# Patient Record
Sex: MALE | Race: WHITE | NOT HISPANIC OR LATINO | URBAN - METROPOLITAN AREA
[De-identification: names, ages, dates, MRNs, and addresses within clinical notes are randomized per-mention and may not be internally consistent; named-entity substitution may affect disease eponyms.]

---

## 2020-03-04 ENCOUNTER — OFFICE (OUTPATIENT)
Dept: URBAN - METROPOLITAN AREA CLINIC 79 | Facility: CLINIC | Age: 55
End: 2020-03-04

## 2020-03-04 VITALS
TEMPERATURE: 97.9 F | SYSTOLIC BLOOD PRESSURE: 120 MMHG | HEIGHT: 66 IN | HEART RATE: 72 BPM | WEIGHT: 177 LBS | DIASTOLIC BLOOD PRESSURE: 84 MMHG

## 2020-03-04 DIAGNOSIS — K76.0 FATTY (CHANGE OF) LIVER, NOT ELSEWHERE CLASSIFIED: ICD-10-CM

## 2020-03-04 DIAGNOSIS — Z12.11 ENCOUNTER FOR SCREENING FOR MALIGNANT NEOPLASM OF COLON: ICD-10-CM

## 2020-03-04 DIAGNOSIS — R12 HEARTBURN: ICD-10-CM

## 2020-03-04 DIAGNOSIS — R10.11 RIGHT UPPER QUADRANT PAIN: ICD-10-CM

## 2020-03-04 PROCEDURE — 00031: CPT | Performed by: INTERNAL MEDICINE

## 2020-03-04 PROCEDURE — 99204 OFFICE O/P NEW MOD 45 MIN: CPT | Performed by: PHYSICIAN ASSISTANT

## 2020-03-04 NOTE — SERVICEHPINOTES
GER SMITH   is a   55   year old male who is being seen in consultation at the request of   GLORIA SAINI   for concerns about his gallbladder and also to schedule a screening colonoscopy as he has never had one. He notes that he has been having pain in gallbladder area and reports a negative U/S and then a f/u HIDA scan showing EF of 18% (we do not have these records).Patient has h/o RUQ pain for the past 3 years. Can happen with exercise, stress, lack of food intake, and also with fatty foods. Worst symptoms are post-prandially, with fried foods or meats. Pain lasts 30-60 minutes and feels better if he stands up. No radiation of the pain. Pain can be sharp, sometimes achy. One time he was sitting up in bed and sneezed and felt a knife-like pain - he suspects that he pulled a muscle. He denies N/V, fevers, weight loss, black stools. He does have heartburn and reflux symptoms a few times a month - will take OTC meds as needed (helps). No dysphagia. He notes that his sister had an appendectomy and at that time they took out her GB as well due to its appearance. He reports pretty regular bowel habits though can have mild constipation at times. He can see some mucous in stools when he is dehydrated. Notes h/o heat exhaustion and A fib in the past - both from dehydration. No current heart problems.He reports fatty liver seen on U/S. He has had mild ALT elevation noted on labs - level has been in the 40s and has been stable since 2018. Can have bloating, worse after consumption of breads. BR

## 2020-03-31 LAB
CELIAC DISEASE COMPREHENSIVE: DEAMIDATED GLIADIN ABS, IGA: 4 UNITS (ref 0–19)
CELIAC DISEASE COMPREHENSIVE: DEAMIDATED GLIADIN ABS, IGG: 5 UNITS (ref 0–19)
CELIAC DISEASE COMPREHENSIVE: ENDOMYSIAL ANTIBODY IGA: NEGATIVE
CELIAC DISEASE COMPREHENSIVE: IMMUNOGLOBULIN A, QN, SERUM: 201 MG/DL (ref 90–386)
CELIAC DISEASE COMPREHENSIVE: T-TRANSGLUTAMINASE (TTG) IGA: <2 U/ML
CELIAC DISEASE COMPREHENSIVE: T-TRANSGLUTAMINASE (TTG) IGG: 5 U/ML (ref 0–5)
HCV ANTIBODY: HEP C VIRUS AB: <0.1 S/CO RATIO